# Patient Record
Sex: MALE | Race: BLACK OR AFRICAN AMERICAN | NOT HISPANIC OR LATINO | Employment: FULL TIME | URBAN - METROPOLITAN AREA
[De-identification: names, ages, dates, MRNs, and addresses within clinical notes are randomized per-mention and may not be internally consistent; named-entity substitution may affect disease eponyms.]

---

## 2017-05-23 ENCOUNTER — HOSPITAL ENCOUNTER (EMERGENCY)
Facility: HOSPITAL | Age: 37
Discharge: HOME | End: 2017-05-23
Attending: EMERGENCY MEDICINE | Admitting: EMERGENCY MEDICINE

## 2017-05-23 VITALS
TEMPERATURE: 97.9 F | DIASTOLIC BLOOD PRESSURE: 91 MMHG | RESPIRATION RATE: 18 BRPM | HEART RATE: 53 BPM | WEIGHT: 180 LBS | OXYGEN SATURATION: 98 % | SYSTOLIC BLOOD PRESSURE: 131 MMHG

## 2017-05-23 DIAGNOSIS — R31.0 GROSS HEMATURIA: Primary | ICD-10-CM

## 2017-05-23 LAB
ANION GAP BLD CALC-SCNC: 16 MMOL/L (ref 4–13)
BACTERIA UR QL AUTO: ABNORMAL /HPF
BILIRUB UR QL STRIP: NEGATIVE
BUN BLD-MCNC: 17 MG/DL (ref 5–25)
CA-I BLD-SCNC: 1.15 MMOL/L (ref 1.12–1.32)
CHLORIDE BLD-SCNC: 103 MMOL/L (ref 100–108)
CLARITY UR: ABNORMAL
CLARITY, POC: NORMAL
COLOR UR: ABNORMAL
COLOR, POC: NORMAL
CREAT BLD-MCNC: 1.2 MG/DL (ref 0.6–1.3)
GFR SERPL CREATININE-BSD FRML MDRD: >60 ML/MIN/1.73SQ M
GLUCOSE SERPL-MCNC: 113 MG/DL (ref 65–140)
GLUCOSE UR STRIP-MCNC: NEGATIVE MG/DL
HCT VFR BLD CALC: 42 % (ref 36.5–49.3)
HGB BLDA-MCNC: 14.3 G/DL (ref 12–17)
HGB UR QL STRIP.AUTO: ABNORMAL
HYALINE CASTS #/AREA URNS LPF: ABNORMAL /LPF
KETONES UR STRIP-MCNC: NEGATIVE MG/DL
LEUKOCYTE ESTERASE UR QL STRIP: ABNORMAL
NITRITE UR QL STRIP: NEGATIVE
NON-SQ EPI CELLS URNS QL MICRO: ABNORMAL /HPF
PCO2 BLD: 28 MMOL/L (ref 21–32)
PH UR STRIP.AUTO: 6.5 [PH] (ref 4.5–8)
POTASSIUM BLD-SCNC: 3.9 MMOL/L (ref 3.5–5.3)
PROT UR STRIP-MCNC: ABNORMAL MG/DL
RBC #/AREA URNS AUTO: ABNORMAL /HPF
SODIUM BLD-SCNC: 142 MMOL/L (ref 136–145)
SP GR UR STRIP.AUTO: 1.02 (ref 1–1.03)
SPECIMEN SOURCE: ABNORMAL
UROBILINOGEN UR QL STRIP.AUTO: 1 E.U./DL
WBC #/AREA URNS AUTO: ABNORMAL /HPF

## 2017-05-23 PROCEDURE — 85014 HEMATOCRIT: CPT

## 2017-05-23 PROCEDURE — 87491 CHLMYD TRACH DNA AMP PROBE: CPT | Performed by: EMERGENCY MEDICINE

## 2017-05-23 PROCEDURE — 87591 N.GONORRHOEAE DNA AMP PROB: CPT | Performed by: EMERGENCY MEDICINE

## 2017-05-23 PROCEDURE — 99283 EMERGENCY DEPT VISIT LOW MDM: CPT

## 2017-05-23 PROCEDURE — 80047 BASIC METABLC PNL IONIZED CA: CPT

## 2017-05-23 PROCEDURE — 81002 URINALYSIS NONAUTO W/O SCOPE: CPT | Performed by: EMERGENCY MEDICINE

## 2017-05-23 PROCEDURE — 81001 URINALYSIS AUTO W/SCOPE: CPT

## 2017-05-23 PROCEDURE — 51798 US URINE CAPACITY MEASURE: CPT

## 2017-05-23 RX ORDER — CEPHALEXIN 500 MG/1
500 CAPSULE ORAL 2 TIMES DAILY
Qty: 10 CAPSULE | Refills: 0 | Status: SHIPPED | OUTPATIENT
Start: 2017-05-23 | End: 2017-05-28

## 2017-05-24 LAB
CHLAMYDIA DNA CVX QL NAA+PROBE: NORMAL
N GONORRHOEA DNA GENITAL QL NAA+PROBE: NORMAL

## 2021-01-07 ENCOUNTER — HOSPITAL ENCOUNTER (EMERGENCY)
Facility: HOSPITAL | Age: 41
Discharge: HOME | End: 2021-01-07
Attending: EMERGENCY MEDICINE | Admitting: EMERGENCY MEDICINE
Payer: OTHER MISCELLANEOUS

## 2021-01-07 VITALS — TEMPERATURE: 98.1 F | RESPIRATION RATE: 18 BRPM | HEART RATE: 68 BPM | OXYGEN SATURATION: 99 %

## 2021-01-07 DIAGNOSIS — H10.32 ACUTE CONJUNCTIVITIS OF LEFT EYE, UNSPECIFIED ACUTE CONJUNCTIVITIS TYPE: ICD-10-CM

## 2021-01-07 DIAGNOSIS — T26.02XA BURN OF LEFT EYELID, INITIAL ENCOUNTER: ICD-10-CM

## 2021-01-07 DIAGNOSIS — H53.9 VISUAL DISTURBANCE: Primary | ICD-10-CM

## 2021-01-07 PROCEDURE — 99282 EMERGENCY DEPT VISIT SF MDM: CPT

## 2021-01-07 PROCEDURE — 99284 EMERGENCY DEPT VISIT MOD MDM: CPT | Performed by: PHYSICIAN ASSISTANT

## 2021-01-07 RX ORDER — TETRACAINE HYDROCHLORIDE 5 MG/ML
1 SOLUTION OPHTHALMIC ONCE
Status: DISCONTINUED | OUTPATIENT
Start: 2021-01-07 | End: 2021-01-07 | Stop reason: HOSPADM

## 2021-01-07 RX ORDER — ERYTHROMYCIN 5 MG/G
OINTMENT OPHTHALMIC
Qty: 1 G | Refills: 0 | Status: SHIPPED | OUTPATIENT
Start: 2021-01-07

## 2021-01-07 RX ORDER — CIPROFLOXACIN HYDROCHLORIDE 3.5 MG/ML
1 SOLUTION/ DROPS TOPICAL 3 TIMES DAILY
Qty: 5 ML | Refills: 0 | Status: SHIPPED | OUTPATIENT
Start: 2021-01-07

## 2021-01-07 NOTE — Clinical Note
Enedelia Parker was seen and treated in our emergency department on 1/7/2021  Diagnosis:     Sera López  may return to work on return date  He may return on this date: 01/09/2021         If you have any questions or concerns, please don't hesitate to call        Indu De La Cruz MD    ______________________________           _______________          _______________  Hospital Representative                              Date                                Time

## 2021-01-09 NOTE — ED PROVIDER NOTES
History  Chief Complaint   Patient presents with    Eye Problem     pt reports he got a cleaning chemical in his L eye at work  reports blurred vision  eye redness and lid swelling noted      41 y o  male presents for evaluation of left eye pain, swelling and wound on left eyelid  Notes while at work and he was cleaning hot griddle last night, he was splashed with hot griddle , while cleaning flat top  States he did flush eye copiously , then used eye was solution provided by his place of employment  States he did have tiny burn to upper eyelid, eyelid is swollen and tender, vision is slightly blurry  Denies AH, N/V/D, SOB, CP          None       Past Medical History:   Diagnosis Date    Diabetes mellitus ([facility] [address] )        No past surgical history on file  No family history on file  I have reviewed and agree with the history as documented  E-Cigarette/Vaping     E-Cigarette/Vaping Substances     Social History     Tobacco Use    Smoking status: Current Every Day Smoker   Substance Use Topics    Alcohol use: Yes     Comment: occasoinly    Drug use: No       Review of Systems   Eyes: Positive for pain and visual disturbance  All other systems reviewed and are negative  Physical Exam  Physical Exam  Vitals signs and nursing note reviewed  Constitutional:       Appearance: Normal appearance  He is normal weight  HENT:      Head: Normocephalic and atraumatic  Right Ear: External ear normal       Left Ear: External ear normal       Mouth/Throat:      Mouth: Mucous membranes are moist       Pharynx: Oropharynx is clear  Eyes:      General: Vision grossly intact  Extraocular Movements:      Left eye: Normal extraocular motion and no nystagmus  Conjunctiva/sclera:      Left eye: Left conjunctiva is injected  Pupils: Pupils are equal, round, and reactive to light  Left eye: No corneal abrasion or fluorescein uptake        Slit lamp exam:     Left eye: No corneal flare, corneal ulcer, foreign body or photophobia  Comments: No fluroscein uptake noted, tace lid edema, small wound note on upper eyelid  PH 7 0-7 5     Neck:      Musculoskeletal: Normal range of motion  Cardiovascular:      Rate and Rhythm: Normal rate and regular rhythm  Pulses: Normal pulses  Heart sounds: Normal heart sounds  Pulmonary:      Effort: Pulmonary effort is normal       Breath sounds: Normal breath sounds  Abdominal:      General: Abdomen is flat  Musculoskeletal: Normal range of motion  Skin:     General: Skin is warm  Capillary Refill: Capillary refill takes less than 2 seconds  Neurological:      General: No focal deficit present  Mental Status: He is alert and oriented to person, place, and time  Mental status is at baseline  Psychiatric:         Mood and Affect: Mood normal          Thought Content: Thought content normal          Judgment: Judgment normal          Vital Signs  ED Triage Vitals   Temperature Pulse Respirations BP SpO2   01/07/21 1431 01/07/21 1336 01/07/21 1336 -- 01/07/21 1336   98 1 °F (36 7 °C) 68 18  99 %      Temp src Heart Rate Source Patient Position - Orthostatic VS BP Location FiO2 (%)   -- 01/07/21 1336 -- -- --    Monitor         Pain Score       --                  Vitals:    01/07/21 1336   Pulse: 68         Visual Acuity      ED Medications  Medications - No data to display    Diagnostic Studies  Results Reviewed     None                 No orders to display              Procedures  Procedures         ED Course                                           MDM  Number of Diagnoses or Management Options  Acute conjunctivitis of left eye, unspecified acute conjunctivitis type:   Burn of left eyelid, initial encounter:   Visual disturbance:   Diagnosis management comments: spokw with optho on call  Recommended erythromycin ointment, q HS Ciloxan TID and follow up outpt    Strict return precautions discussed, follow up discussed, all questions answered  Pt verbalized understanding will DC home      Disposition  Final diagnoses:   Visual disturbance   Burn of left eyelid, initial encounter   Acute conjunctivitis of left eye, unspecified acute conjunctivitis type     Time reflects when diagnosis was documented in both MDM as applicable and the Disposition within this note     Time User Action Codes Description Comment    1/7/2021  2:38 PM Zeb Pinch Add [H53 9] Visual disturbance     1/7/2021  2:39 PM Linda Granda  02XA] Burn of left eyelid, initial encounter     1/7/2021  2:40 PM Zeb Pinch Add [H10 32] Acute conjunctivitis of left eye, unspecified acute conjunctivitis type       ED Disposition     ED Disposition Condition Date/Time Comment    Discharge Stable Thu Jan 7, 2021  2:40 PM Hugo Walker discharge to home/self care  Follow-up Information     Follow up With Specialties Details Why [facility]   [address] [place]  [address] [place] [address]  [phone]    Jerone Crimes    [phone]            Discharge Medication List as of 1/7/2021  2:40 PM      START taking these medications    Details   ciprofloxacin (CILOXAN) 0 3 % ophthalmic solution Administer 1 drop into the left eye 3 (three) times a day, Starting Thu 1/7/2021, Print      erythromycin (ILOTYCIN) ophthalmic ointment Place a 1/2 inch ribbon of ointment into the lower eyelid  , Print           No discharge procedures on file      PDMP Review     None          ED Provider  Electronically Signed by           Celestina Mcfarlane PA-C  01/09/21 2970

## 2021-09-08 ENCOUNTER — HOSPITAL ENCOUNTER (EMERGENCY)
Facility: HOSPITAL | Age: 41
Discharge: HOME | End: 2021-09-08
Attending: EMERGENCY MEDICINE

## 2021-09-08 VITALS
SYSTOLIC BLOOD PRESSURE: 157 MMHG | HEIGHT: 68 IN | WEIGHT: 170 LBS | DIASTOLIC BLOOD PRESSURE: 109 MMHG | OXYGEN SATURATION: 99 % | TEMPERATURE: 98.7 F | BODY MASS INDEX: 25.76 KG/M2 | RESPIRATION RATE: 16 BRPM | HEART RATE: 70 BPM

## 2021-09-08 DIAGNOSIS — R68.83 CHILLS: Primary | ICD-10-CM

## 2021-09-08 DIAGNOSIS — R03.0 ELEVATED BLOOD PRESSURE READING: ICD-10-CM

## 2021-09-08 DIAGNOSIS — R53.83 FATIGUE: ICD-10-CM

## 2021-09-08 DIAGNOSIS — R09.81 NASAL CONGESTION: ICD-10-CM

## 2021-09-08 DIAGNOSIS — Z20.822 ENCOUNTER FOR LABORATORY TESTING FOR COVID-19 VIRUS: ICD-10-CM

## 2021-09-08 LAB — SARS-COV-2 RNA RESP QL NAA+PROBE: POSITIVE

## 2021-09-08 PROCEDURE — U0003 INFECTIOUS AGENT DETECTION BY NUCLEIC ACID (DNA OR RNA); SEVERE ACUTE RESPIRATORY SYNDROME CORONAVIRUS 2 (SARS-COV-2) (CORONAVIRUS DISEASE [COVID-19]), AMPLIFIED PROBE TECHNIQUE, MAKING USE OF HIGH THROUGHPUT TECHNOLOGIES AS DESCRIBED BY CMS-2020-01-R: HCPCS | Performed by: EMERGENCY MEDICINE

## 2021-09-08 PROCEDURE — 99283 EMERGENCY DEPT VISIT LOW MDM: CPT

## 2021-09-08 PROCEDURE — 99284 EMERGENCY DEPT VISIT MOD MDM: CPT | Performed by: EMERGENCY MEDICINE

## 2021-09-08 PROCEDURE — U0005 INFEC AGEN DETEC AMPLI PROBE: HCPCS | Performed by: EMERGENCY MEDICINE

## 2021-09-08 RX ORDER — OXYMETAZOLINE HYDROCHLORIDE 0.05 G/100ML
2 SPRAY NASAL 2 TIMES DAILY
Qty: 1.2 ML | Refills: 0 | Status: SHIPPED | OUTPATIENT
Start: 2021-09-08 | End: 2021-09-13

## 2021-09-08 RX ORDER — IBUPROFEN 600 MG/1
600 TABLET ORAL EVERY 6 HOURS PRN
Qty: 30 TABLET | Refills: 0 | Status: SHIPPED | OUTPATIENT
Start: 2021-09-08

## 2021-09-08 RX ORDER — SENNOSIDES 8.6 MG
650 CAPSULE ORAL EVERY 8 HOURS PRN
Qty: 30 TABLET | Refills: 0 | Status: SHIPPED | OUTPATIENT
Start: 2021-09-08

## 2021-09-08 NOTE — Clinical Note
Walter Herrera was seen and treated in our emergency department on 9/8/2021  Diagnosis:     Jennifer Laughlin    He may return on this date: If you have any questions or concerns, please don't hesitate to call        Konrad Coles MD    ______________________________           _______________          _______________  Hospital Representative                              Date                                Time

## 2021-09-08 NOTE — Clinical Note
Luis Enrique Ravindra was seen and treated in our emergency department on 9/8/2021  Diagnosis:     Tyler Palmer    He may return on this date: If you have any questions or concerns, please don't hesitate to call        Sandra Armenta MD    ______________________________           _______________          _______________  Hospital Representative                              Date                                Time

## 2021-09-08 NOTE — ED PROVIDER NOTES
History  Chief Complaint   Patient presents with   Marianne Clementine     states he was recently in [employer], started with chills  c/o increasing fatigue  34 yo M with h/o type 2 DM, smoking, presenting for 1 week of chills, fatigue, and generalized malaise  Started initially on 9/1/21 while patient was in [facility] on vacation  No confirmed COVID-19 exposures  Patient is not vaccinated for COVID-19  Has also noted some frontal head pressure intermittently  No neck pain  Has not had any GI symptoms  No abdominal pain  Has not any respiratory symptoms, no chest pain, shortness of breath, coughing  No other complaints today  History provided by:  Patient   used: No        Prior to Admission Medications   Prescriptions Last Dose Informant Patient Reported? Taking?   ciprofloxacin (CILOXAN) 0 3 % ophthalmic solution   No No   Sig: Administer 1 drop into the left eye 3 (three) times a day   erythromycin (ILOTYCIN) ophthalmic ointment   No No   Sig: Place a 1/2 inch ribbon of ointment into the lower eyelid  Facility-Administered Medications: None       Past Medical History:   Diagnosis Date    Diabetes mellitus ([facility] [address] )        No past surgical history on file  No family history on file  I have reviewed and agree with the history as documented  E-Cigarette/Vaping     E-Cigarette/Vaping Substances     Social History     Tobacco Use    Smoking status: Current Every Day Smoker   Substance Use Topics    Alcohol use: Yes     Comment: occasoinly    Drug use: No        Review of Systems   Constitutional: Positive for chills and fatigue  Negative for fever  HENT: Negative for congestion, rhinorrhea and sore throat  Eyes: Negative for pain and visual disturbance  Respiratory: Negative for cough, shortness of breath and wheezing  Cardiovascular: Negative for chest pain and palpitations  Gastrointestinal: Negative for abdominal pain, diarrhea, nausea and vomiting  Genitourinary: Negative for dysuria and hematuria  Musculoskeletal: Negative for arthralgias and back pain  Skin: Negative for color change and rash  Neurological: Negative for syncope, weakness, light-headedness and headaches  Psychiatric/Behavioral: Negative for confusion  The patient is not nervous/anxious  All other systems reviewed and are negative  Physical Exam  ED Triage Vitals [09/08/21 1341]   Temperature Pulse Respirations Blood Pressure SpO2   98 7 °F (37 1 °C) 70 16 (!) 157/109 99 %      Temp Source Heart Rate Source Patient Position - Orthostatic VS BP Location FiO2 (%)   Tympanic Monitor Sitting Right arm --      Pain Score       No Pain             Orthostatic Vital Signs  Vitals:    09/08/21 1341   BP: (!) 157/109   Pulse: 70   Patient Position - Orthostatic VS: Sitting       Physical Exam  Vitals and nursing note reviewed  Constitutional:       General: He is not in acute distress  Appearance: Normal appearance  He is well-developed  He is not ill-appearing or diaphoretic  HENT:      Head: Normocephalic and atraumatic  Right Ear: External ear normal       Left Ear: External ear normal       Nose: Nose normal       Mouth/Throat:      Mouth: Mucous membranes are moist       Pharynx: Oropharynx is clear  Eyes:      Conjunctiva/sclera: Conjunctivae normal       Pupils: Pupils are equal, round, and reactive to light  Cardiovascular:      Rate and Rhythm: Normal rate and regular rhythm  Heart sounds: No murmur heard  Pulmonary:      Effort: Pulmonary effort is normal  No respiratory distress  Breath sounds: Normal breath sounds  Abdominal:      General: Abdomen is flat  There is no distension  Palpations: Abdomen is soft  Tenderness: There is no abdominal tenderness  Musculoskeletal:         General: Normal range of motion  Cervical back: Normal range of motion and neck supple  Right lower leg: No edema        Left lower leg: No edema    Skin:     General: Skin is warm and dry  Neurological:      General: No focal deficit present  Mental Status: He is alert  Psychiatric:         Mood and Affect: Mood normal          ED Medications  Medications - No data to display    Diagnostic Studies  Results Reviewed     Procedure Component Value Units Date/Time    [id number]  (Abnormal) Collected: 09/08/21 1512    Lab Status: Final result Specimen: Nares from Nose Updated: 09/08/21 1703     SARS-CoV-2 Positive    Narrative:                        No orders to display         Procedures  Procedures      ED Course                                       MDM  Number of Diagnoses or Management Options  Chills  Elevated blood pressure reading  Encounter for laboratory testing for COVID-19 virus  Fatigue  Nasal congestion  Diagnosis management comments: 40-year-old male presenting for evaluation of 40-year-old male presenting for evaluation of chills, fatigue, recent travel to [facility]  Concerning for COVID-19 infection  Patient also has nasal congestion, will treat with Afrin, acetaminophen, ibuprofen  Follow up with primary care physician  Discussed return precautions with patient as well as quarantine duration if patient is found to be positive        Disposition  Final diagnoses:   Chills   Fatigue   Elevated blood pressure reading   Encounter for laboratory testing for COVID-19 virus   Nasal congestion     Time reflects when diagnosis was documented in both MDM as applicable and the Disposition within this note     Time User Action Codes Description Comment    9/8/2021  3:18 PM Mary, Olmito Bent Add [R68 83] Chills     9/8/2021  3:18 PM Mary, Olmito Bent Add [R53 83] Fatigue     9/8/2021  3:18 PM Mary, Lavelle Bent Add [R03 0] Elevated blood pressure reading     9/8/2021  3:18 PM Mary, Olmito Bent Add [Z20 822] Encounter for laboratory testing for COVID-19 virus     9/8/2021  3:26 PM Mary, Olmito Bent Add [R09 81] Nasal congestion       ED Disposition     ED Disposition Condition Date/Time Comment    Discharge Good Wed Sep 8, 2021  3:18 PM Lorraine Wilson discharge to home/self care  Follow-up Information     Follow up With Specialties Details Why Contact Info Additional [address] Emergency Department Emergency Medicine Go to  As needed [address] [facility]  [address] Emergency Department, [address], RENAN Perez [facility], [address]  Internal Medicine Schedule an appointment as soon as possible for a visit in 1 week For reevaluation as we discussed  [address] [facility] [address]  [facility] [address], [address], [address], [place], [facility], [address] [phone]          Discharge Medication List as of 9/8/2021  3:27 PM      START taking these medications    Details   acetaminophen (TYLENOL) 650 mg CR tablet Take 1 tablet (650 mg total) by mouth every 8 (eight) hours as needed for mild pain, Starting Wed 9/8/2021, Print      ibuprofen (MOTRIN) 600 mg tablet Take 1 tablet (600 mg total) by mouth every 6 (six) hours as needed for mild pain or moderate pain, Starting Wed 9/8/2021, Print      oxymetazoline (AFRIN) 0 05 % nasal spray 2 sprays by Each Nare route 2 (two) times a day for 3 days, Starting Wed 9/8/2021, Until Sat 9/11/2021, Print         CONTINUE these medications which have NOT CHANGED    Details   ciprofloxacin (CILOXAN) 0 3 % ophthalmic solution Administer 1 drop into the left eye 3 (three) times a day, Starting Thu 1/7/2021, Print      erythromycin (ILOTYCIN) ophthalmic ointment Place a 1/2 inch ribbon of ointment into the lower eyelid  , Print           No discharge procedures on file      PDMP Review     None           ED Provider  Attending physically available and evaluated Hal Lobo Huong Alvarez I managed the patient along with the ED Attending      Electronically Signed by         Lilli Nichols MD  09/09/21 6212

## 2021-09-08 NOTE — Clinical Note
Ariana Hayden was seen and treated in our emergency department on 9/8/2021  Diagnosis:     Janessa Para    He may return on this date: If you have any questions or concerns, please don't hesitate to call        Adelso Cardoso MD    ______________________________           _______________          _______________  Hospital Representative                              Date                                Time

## 2021-09-08 NOTE — ED ATTENDING ATTESTATION
Final Diagnosis:  1  Chills    2  Fatigue    3  Elevated blood pressure reading    4  Encounter for laboratory testing for COVID-19 virus    5  Nasal congestion           I, Skinny Morales MD, saw and evaluated the patient  All available labs and X-rays were ordered by me or the resident and have been reviewed by myself  I discussed the patient with the resident / non-physician and agree with the resident's / non-physician practitioner's findings and plan as documented in the resident's / non-physician practicitioner's note, except where noted  At this point, I agree with the current assessment done in the ED  I was present during key portions of all procedures performed unless otherwise stated  Chief Complaint   Patient presents with    Chills     states he was recently in [facility], started with chills  c/o increasing fatigue  This is a 35 y o  male presenting for evaluation of chills, fatigue x3 days  Was in [facility] 1 week ago  No f/ch   +myalgias  No SOB  Quit smoking 5 days ago  No CP  PMH:   has a past medical history of Diabetes mellitus ([facility] [address] )  PSH:   has no past surgical history on file  Social:  Social History     Substance and Sexual Activity   Alcohol Use Yes    Comment: occasoinly     Social History     Tobacco Use   Smoking Status Current Every Day Smoker     Social History     Substance and Sexual Activity   Drug Use No     PE:  Vitals:    09/08/21 1341   BP: (!) 157/109   BP Location: Right arm   Pulse: 70   Resp: 16   Temp: 98 7 °F (37 1 °C)   TempSrc: Tympanic   SpO2: 99%   Weight: 77 1 kg (170 lb)   Height: 5' 8" (1 727 m)   General: VSS, NAD, awake, alert  Well-nourished, well-developed  Appears stated age  Head: Normocephalic, atraumatic, nontender  Eyes: PERRL, EOM-I  No diplopia  No hyphema  No subconjunctival hemorrhages  Symmetrical lids  ENTAtraumatic external nose and ears  MMM  No stridor  Normal phonation  No drooling     Base of mouth is soft  No mastoid tenderness  Neck: Symmetric, trachea midline  No JVD  CV: Peripheral pulses +2 throughout  No chest wall tenderness  Lungs:   Unlabored   No retractions  No crepitus  No tachypnea  No paradoxical motion  Abd: +BS, soft, NT/ND    MSK:   FROM   No lower extremity edema  Back:   No CVAT  Skin: Dry, intact  Neuro: AAOx3, GCS 15, CN II-XII grossly intact  Motor grossly intact  Psychiatric/Behavioral: Appropriate mood and affect   Exam: deferred  A:  - COVID test  P:  - viral syndrome  - reassuring exam  - DC    - 13 point ROS was performed and all are normal unless stated in the history above  - Nursing note reviewed  Vitals reviewed  - Orders placed by myself and/or advanced practitioner / resident     - Previous chart was reviewed  - No language barrier    - History obtained from patient  - There are no limitations to the history obtained  - Critical care time: Not applicable for this patient  Code Status: No Order  Advance Directive and Living Will:      Power of :    POLST:      Medications - No data to display  No orders to display     Orders Placed This Encounter   Procedures    Novel Coronavirus (Covid-19),PCR [facility]     Labs Reviewed - No data to display  Time reflects when diagnosis was documented in both MDM as applicable and the Disposition within this note       Time User Action Codes Description Comment    9/8/2021  3:18 PM Neetu Hernandez Add [R68 83] Chills     9/8/2021  3:18 PM Tonya Hernandez Add [R53 83] Fatigue     9/8/2021  3:18 PM Neetu Hernandez Add [R03 0] Elevated blood pressure reading     9/8/2021  3:18 PM Neetu Hernandez Add [Z20 822] Encounter for laboratory testing for COVID-19 virus     9/8/2021  3:26 PM Neetu Hernandez Add [R09 81] Nasal congestion           ED Disposition       ED Disposition Condition Date/Time Comment    Discharge Good Wed Sep 8, 2021  3:18 PM Loco Mix discharge to home/self care                  Follow-up Information       Follow up With Specialties Details Why Contact Info Additional [address] Emergency Department Emergency Medicine Go to  As needed [address]  [address] [facility] [address] Emergency Department, [address], [place], [address] [facility], [address] Internal Medicine Schedule an appointment as soon as possible for a visit in 1 week For reevaluation as we discussed  [address] [facility] [address]  [facility] [address], [address], [address], [place], [address] [facility], [address] [phone]          Discharge Medication List as of 9/8/2021  3:27 PM        START taking these medications    Details   acetaminophen (TYLENOL) 650 mg CR tablet Take 1 tablet (650 mg total) by mouth every 8 (eight) hours as needed for mild pain, Starting Wed 9/8/2021, Print      ibuprofen (MOTRIN) 600 mg tablet Take 1 tablet (600 mg total) by mouth every 6 (six) hours as needed for mild pain or moderate pain, Starting Wed 9/8/2021, Print      oxymetazoline (AFRIN) 0 05 % nasal spray 2 sprays by Each Nare route 2 (two) times a day for 3 days, Starting Wed 9/8/2021, Until Sat 9/11/2021, Print           CONTINUE these medications which have NOT CHANGED    Details   ciprofloxacin (CILOXAN) 0 3 % ophthalmic solution Administer 1 drop into the left eye 3 (three) times a day, Starting Thu 1/7/2021, Print      erythromycin (ILOTYCIN) ophthalmic ointment Place a 1/2 inch ribbon of ointment into the lower eyelid  , Print           No discharge procedures on file  Prior to Admission Medications   Prescriptions Last Dose Informant Patient Reported?  Taking?   ciprofloxacin (CILOXAN) 0 3 % ophthalmic solution   No No   Sig: Administer 1 drop into the left eye 3 (three) times a day   erythromycin (ILOTYCIN) ophthalmic ointment   No No   Sig: Place a 1/2 inch ribbon of ointment into the lower eyelid  Facility-Administered Medications: None       Portions of the record may have been created with voice recognition software  Occasional wrong word or "sound a like" substitutions may have occurred due to the inherent limitations of voice recognition software  Read the chart carefully and recognize, using context, where substitutions have occurred      Electronically signed by:  Mark Mackey

## 2021-09-13 ENCOUNTER — TELEMEDICINE (OUTPATIENT)
Dept: INTERNAL MEDICINE CLINIC | Facility: CLINIC | Age: 41
End: 2021-09-13

## 2021-09-13 DIAGNOSIS — U07.1 COVID-19: Primary | ICD-10-CM

## 2021-09-13 PROCEDURE — 99213 OFFICE O/P EST LOW 20 MIN: CPT | Performed by: INTERNAL MEDICINE

## 2021-09-13 NOTE — ASSESSMENT & PLAN NOTE
· Patient developed symptoms around 09/01/21 including chills, dyspnea on exertion and nasal congestion after [facility] [place] vacation  · Patient presented to [facility] on 09/08- COVID PCR positive  Patient was discharged with Afrin, Ibuprofen, and Tylenol    · Symptoms currently improving  · Repeat COVID test in 5 days- can return to work if negative  · Discussed quarantine precautions

## 2021-09-13 NOTE — PROGRESS NOTES
[facility] Visit Note  Omar Arango 35 y o  male   MRN: [id number]    Assessment and Plan    1  COVID-19  Assessment & Plan:  · Patient developed symptoms around 09/01/21 including chills, dyspnea on exertion and nasal congestion after [facility] vacation  · Patient presented to [facility] ED on 09/08- COVID PCR positive  Patient was discharged with Afrin, Ibuprofen, and Tylenol  · Symptoms currently improving  · Repeat COVID test in 5 days- can return to work if negative  · Discussed quarantine precautions          Follow up in our clinic in 3 month(s) to establish care  Subjective   CHIEF COMPLAINT:   Chief Complaint   Patient presents with   [id number] [phone] [id number]     Per patient he still have loss of smell  Per patient his energy is getting better    Virtual Regular Visit      HISTORY OF PRESENT ILLNESS:  Omar Arango is a 35 y o  male presenting through telemedicine visit today for COVID follow-up  Patient developed symptoms around 09/01/21 including chills, dyspnea on exertion and nasal congestion  Patient presented to [facility] ED on 09/08- COVID PCR positive  Patient was discharged with Afrin, Ibuprofen, and Tylenol  Patient took Ibuprofen 600 mg twice daily for a couple days, and has used the Afrin as needed for nasal congestion  Patient has been checking temperatures, has remained afebrile  Patient currently feels much better  Current symptoms include loss of smell and some mild dyspnea on exertion that has greatly improved  Denies SOB, chest pain, headache, cough, swelling, or other complaints at this time  Patient reports that he is strongly considering getting the COVID vaccine  Patient would like to establish care with us here at [facility] [address] in the future  Patient states that he recently quit smoking cigarettes about 3-4 days prior to COVID positive- was smoking 1/4 pack per day since 10 yrs old   Patient is very motivated to continue to improve his health for his children and grandchild  Patient lives in [place] [facility] [place] [facility] [place] and works at [employer]- he will get a COVID test in 4-5 days- if negative can return to work  Review of Systems   Constitutional: Negative for chills, fatigue, fever and unexpected weight change  HENT: Negative for congestion, rhinorrhea, sinus pain and sore throat  Loss of smell   Eyes: Negative for visual disturbance  Respiratory: Positive for shortness of breath (mild, on exertion)  Negative for cough  Cardiovascular: Negative for chest pain, palpitations and leg swelling  Gastrointestinal: Negative for abdominal pain, blood in stool, constipation, diarrhea, nausea and vomiting  Endocrine: Negative for cold intolerance, heat intolerance, polydipsia and polyuria  Genitourinary: Negative for difficulty urinating, dysuria, frequency, hematuria and urgency  Musculoskeletal: Negative for arthralgias, back pain and myalgias  Skin: Negative for rash and wound  Neurological: Negative for dizziness, syncope, weakness, light-headedness and headaches  Psychiatric/Behavioral: Negative for behavioral problems, confusion and sleep disturbance  Objective     Vitals:     Physical Exam:  Physical Exam:     General: Awake, alert, and conversant  No acute distress  Eyes: Normal conjunctiva, anicteric  Round symmetric pupils  ENT: Hearing grossly intact  No nasal discharge  Neck: Neck is supple  No masses or thyromegaly visible  Respiratory: Respirations are non-labored  No audbile wheezing  Cardiovascular: No lower extremity edema  Skin: No rashes or ulcers  Neuro: A&O x 3  Psych: Cooperative  Appropriate mood and affect  Normal judgement           History     Current Outpatient Medications:     ibuprofen (MOTRIN) 600 mg tablet, Take 1 tablet (600 mg total) by mouth every 6 (six) hours as needed for mild pain or moderate pain, Disp: 30 tablet, Rfl: 0    oxymetazoline (AFRIN) 0 05 % nasal spray, 2 sprays by Each Nare route 2 (two) times a day for 3 days, Disp: 1 2 mL, Rfl: 0    acetaminophen (TYLENOL) 650 mg CR tablet, Take 1 tablet (650 mg total) by mouth every 8 (eight) hours as needed for mild pain (Patient not taking: Reported on 9/13/2021), Disp: 30 tablet, Rfl: 0    ciprofloxacin (CILOXAN) 0 3 % ophthalmic solution, Administer 1 drop into the left eye 3 (three) times a day (Patient not taking: Reported on 9/13/2021), Disp: 5 mL, Rfl: 0    erythromycin (ILOTYCIN) ophthalmic ointment, Place a 1/2 inch ribbon of ointment into the lower eyelid  (Patient not taking: Reported on 9/13/2021), Disp: 1 g, Rfl: 0  Allergies   Allergen Reactions    Aspirin      "It makes things worse"      Past Medical History:   Diagnosis Date    Diabetes mellitus ([facility] [address] )      No past surgical history on file  Social History     Socioeconomic History    Marital status: Single     Spouse name: Not on file    Number of children: 3    Years of education: Not on file    Highest education level: Not on file   Occupational History    Not on file   Tobacco Use    Smoking status: Former Smoker    Smokeless tobacco: Never Used    Tobacco comment: Per patient last time he smoke was two week ago   Vaping Use    Vaping Use: Never used   Substance and Sexual Activity    Alcohol use: Yes     Comment: occasoinly    Drug use: Yes     Types: Marijuana     Comment: Occasional    Sexual activity: Yes     Partners: Female   Other Topics Concern    Not on file   Social History Narrative    Not on file     Social Determinants of Health     Financial Resource Strain:     Difficulty of Paying Living Expenses:    Food Insecurity:     Worried About Running Out of Food in the Last Year:     Ran Out of Food in the Last Year:    Transportation Needs:     Lack of Transportation (Medical):      Lack of Transportation (Non-Medical):    Physical Activity:     Days of Exercise per Week:     Minutes of Exercise per Session:    Stress:     Feeling of Stress :    Social Connections:     Frequency of Communication with Friends and Family:     Frequency of Social Gatherings with Friends and Family:     Attends Yarsanism Services:     Active Member of Clubs or Organizations:     Attends Club or Organization Meetings:     Marital Status:    Intimate Partner Violence:     Fear of Current or Ex-Partner:     Emotionally Abused:     Physically Abused:     Sexually Abused:      Family History   Problem Relation Age of Onset    Hypertension Mother     Heart disease Father     No Known Problems Sister     No Known Problems Daughter     No Known Problems Sister     No Known Problems Sister     No Known Problems Sister     No Known Problems Son     No Known Problems Son          DO Eloise Valdovinos [address] Internal Medicine PGY-1  [address] [facility]  [address], [address] [facility]    PLEASE NOTE:  This encounter was completed utilizing the M-Modal[employer] Direct Speech Voice Recognition Software  Grammatical errors, random word insertions, pronoun errors and incomplete sentences are occasional consequences of the system due to software limitations, ambient noise and hardware issues  These may be missed by proof reading prior to affixing electronic signature  Any questions or concerns about the content, text or information contained within the body of this dictation should be directly addressed to the physician for clarification  Please do not hesitate to call me directly if you have any any questions or concerns

## 2025-07-23 ENCOUNTER — HOSPITAL ENCOUNTER (EMERGENCY)
Facility: HOSPITAL | Age: 45
Discharge: HOME | End: 2025-07-23
Attending: EMERGENCY MEDICINE
Payer: COMMERCIAL

## 2025-07-23 VITALS
TEMPERATURE: 99.1 F | DIASTOLIC BLOOD PRESSURE: 80 MMHG | HEIGHT: 68 IN | BODY MASS INDEX: 28.04 KG/M2 | HEART RATE: 65 BPM | RESPIRATION RATE: 18 BRPM | WEIGHT: 185 LBS | OXYGEN SATURATION: 99 % | SYSTOLIC BLOOD PRESSURE: 129 MMHG

## 2025-07-23 DIAGNOSIS — F12.188 CANNABIS HYPEREMESIS SYNDROME CONCURRENT WITH AND DUE TO CANNABIS ABUSE (HCC): ICD-10-CM

## 2025-07-23 DIAGNOSIS — K92.0 HEMATEMESIS/VOMITING BLOOD: Primary | ICD-10-CM

## 2025-07-23 DIAGNOSIS — K29.70 GASTRITIS: ICD-10-CM

## 2025-07-23 LAB
ALBUMIN SERPL BCG-MCNC: 4 G/DL (ref 3.5–5)
ALP SERPL-CCNC: 62 U/L (ref 34–104)
ALT SERPL W P-5'-P-CCNC: 17 U/L (ref 7–52)
ANION GAP SERPL CALCULATED.3IONS-SCNC: 5 MMOL/L (ref 4–13)
AST SERPL W P-5'-P-CCNC: 14 U/L (ref 13–39)
ATRIAL RATE: 55 BPM
BASOPHILS # BLD MANUAL: 0 THOUSAND/UL (ref 0–0.1)
BASOPHILS NFR MAR MANUAL: 0 % (ref 0–1)
BILIRUB SERPL-MCNC: 0.71 MG/DL (ref 0.2–1)
BUN SERPL-MCNC: 10 MG/DL (ref 5–25)
CALCIUM SERPL-MCNC: 8.4 MG/DL (ref 8.4–10.2)
CHLORIDE SERPL-SCNC: 107 MMOL/L (ref 96–108)
CO2 SERPL-SCNC: 28 MMOL/L (ref 21–32)
CREAT SERPL-MCNC: 1.01 MG/DL (ref 0.6–1.3)
EOSINOPHIL # BLD MANUAL: 0.28 THOUSAND/UL (ref 0–0.4)
EOSINOPHIL NFR BLD MANUAL: 5 % (ref 0–6)
ERYTHROCYTE [DISTWIDTH] IN BLOOD BY AUTOMATED COUNT: 14.1 % (ref 11.6–15.1)
GFR SERPL CREATININE-BSD FRML MDRD: 88 ML/MIN/1.73SQ M
GLUCOSE SERPL-MCNC: 96 MG/DL (ref 65–140)
HCT VFR BLD AUTO: 39.7 % (ref 36.5–49.3)
HGB BLD-MCNC: 13.2 G/DL (ref 12–17)
LIPASE SERPL-CCNC: 38 U/L (ref 11–82)
LYMPHOCYTES # BLD AUTO: 1.58 THOUSAND/UL (ref 0.6–4.47)
LYMPHOCYTES # BLD AUTO: 24 % (ref 14–44)
MCH RBC QN AUTO: 27.8 PG (ref 26.8–34.3)
MCHC RBC AUTO-ENTMCNC: 33.2 G/DL (ref 31.4–37.4)
MCV RBC AUTO: 84 FL (ref 82–98)
MONOCYTES # BLD AUTO: 0.4 THOUSAND/UL (ref 0–1.22)
MONOCYTES NFR BLD: 7 % (ref 4–12)
NEUTROPHILS # BLD MANUAL: 3.4 THOUSAND/UL (ref 1.85–7.62)
NEUTS SEG NFR BLD AUTO: 60 % (ref 43–75)
P AXIS: 30 DEGREES
PLATELET # BLD AUTO: 174 THOUSANDS/UL (ref 149–390)
PLATELET BLD QL SMEAR: ADEQUATE
PMV BLD AUTO: 11.4 FL (ref 8.9–12.7)
POTASSIUM SERPL-SCNC: 3.8 MMOL/L (ref 3.5–5.3)
PR INTERVAL: 202 MS
PROT SERPL-MCNC: 6.2 G/DL (ref 6.4–8.4)
QRS AXIS: 49 DEGREES
QRSD INTERVAL: 86 MS
QT INTERVAL: 434 MS
QTC INTERVAL: 415 MS
RBC # BLD AUTO: 4.75 MILLION/UL (ref 3.88–5.62)
RBC MORPH BLD: NORMAL
SODIUM SERPL-SCNC: 140 MMOL/L (ref 135–147)
T WAVE AXIS: 32 DEGREES
VARIANT LYMPHS # BLD AUTO: 4 %
VENTRICULAR RATE: 55 BPM
WBC # BLD AUTO: 5.66 THOUSAND/UL (ref 4.31–10.16)

## 2025-07-23 PROCEDURE — 85007 BL SMEAR W/DIFF WBC COUNT: CPT

## 2025-07-23 PROCEDURE — 36415 COLL VENOUS BLD VENIPUNCTURE: CPT

## 2025-07-23 PROCEDURE — 93005 ELECTROCARDIOGRAM TRACING: CPT

## 2025-07-23 PROCEDURE — 80053 COMPREHEN METABOLIC PANEL: CPT

## 2025-07-23 PROCEDURE — 83690 ASSAY OF LIPASE: CPT

## 2025-07-23 PROCEDURE — 99285 EMERGENCY DEPT VISIT HI MDM: CPT | Performed by: EMERGENCY MEDICINE

## 2025-07-23 PROCEDURE — 85027 COMPLETE CBC AUTOMATED: CPT

## 2025-07-23 PROCEDURE — 93010 ELECTROCARDIOGRAM REPORT: CPT | Performed by: INTERNAL MEDICINE

## 2025-07-23 PROCEDURE — 99284 EMERGENCY DEPT VISIT MOD MDM: CPT

## 2025-07-23 NOTE — DISCHARGE INSTRUCTIONS
You were evaluated in the emergency department today for nausea and vomiting.    Please follow up with you PCP for reassessment of symptoms. Follow up with GI.    Please return to the ED if you experience intractable vomiting, ongoing blood in vomit, inability to eat or drink, fever/chills, blood in stool, inability to urinate for 12 hours or worsening of symptoms.

## 2025-07-23 NOTE — ED PROVIDER NOTES
Time reflects when diagnosis was documented in both MDM as applicable and the Disposition within this note       Time User Action Codes Description Comment    7/23/2025  1:19 PM Krys Zavaleta [K92.0] Hematemesis/vomiting blood     7/23/2025  1:20 PM Krys Zavaleta Add [K29.70] Gastritis     7/23/2025  1:20 PM Krys Zavaleta [F12.188] Cannabis hyperemesis syndrome concurrent with and due to cannabis abuse (HCC)           ED Disposition       ED Disposition   Discharge    Condition   Stable    Date/Time   Wed Jul 23, 2025  1:20 PM    Comment   Saroj Davis discharge to home/self care.                   Assessment & Plan       Medical Decision Making  Patient is a 45 y.o. male  who presents to the ED with vomiting blood.    Vital signs WNL. Exam as listed below.    History and physical exam are most consistent with hematemesis, gastritis, cannabis hyperemesis syndrome  Differential diagnosis I considered includes but is not limited to infectious etiology, obstruction, taco metz tear, cannabis hyperemesis syndrome    Plan   CBC to check for anemia  CMP to look for elevated BUN which could indicate active bleeding  Lipase  Bedside US to assess gallbladder pathology  EKG    Labs unremarkable.  No evidence of anemia.  No elevation of BUN.  Lipase within normal limits.  Bedside ultrasound performed by me reveals no acute gallbladder pathology.    View ED course below for further discussion on patient workup.       All labs reviewed and utilized in the medical decision making process  All radiology studies independently viewed by me and interpreted by the radiologist.  I reviewed all testing with the patient.     Upon re-evaluation patient is stable no nausea or vomiting throughout his ED course.  Did not require any medications. Referral to GI. Discussed return precautions with patient and they expressed understanding.     Portions of the record may have been created with voice recognition  "software. Occasional wrong word or \"sound a like\" substitutions may have occurred due to the inherent limitations of voice recognition software. Read the chart carefully and recognize, using context, where substitutions have occurred.     CRB/CRB       Amount and/or Complexity of Data Reviewed  Labs: ordered.        ED Course as of 07/23/25 1358   Wed Jul 23, 2025   1304 Bedside gallbladder ultrasound performed by me shows no evidence of acute cholecystitis. No gallstones visualized. No pericholecystic fluid. No gallbladder wall thickening   1306 EKG as per my interpretation sinus bradycardia. Rate 55. Normal axis. No ST abnormalities or prolonged intervals.        Medications - No data to display    ED Risk Strat Scores                    No data recorded        SBIRT 20yo+      Flowsheet Row Most Recent Value   Initial Alcohol Screen: US AUDIT-C     1. How often do you have a drink containing alcohol? 1 Filed at: 07/23/2025 1136   2. How many drinks containing alcohol do you have on a typical day you are drinking?  2 Filed at: 07/23/2025 1136   3b. FEMALE Any Age, or MALE 65+: How often do you have 4 or more drinks on one occassion? 1 Filed at: 07/23/2025 1136   Audit-C Score 4 Filed at: 07/23/2025 1136   MICKY: How many times in the past year have you...    Used an illegal drug or used a prescription medication for non-medical reasons? Daily or Almost Daily Filed at: 07/23/2025 1136   DAST-10: In the past 12 months...    1. Have you used drugs other than those required for medical reasons? 0 Filed at: 07/23/2025 1136   2. Do you use more than one drug at a time? 0 Filed at: 07/23/2025 1136   3. Have you had medical problems as a result of your drug use (e.g., memory loss, hepatitis, convulsions, bleeding, etc.)? 0 Filed at: 07/23/2025 1136   4. Have you had \"blackouts\" or \"flashbacks\" as a result of drug use?YesNo 0 Filed at: 07/23/2025 1136   5. Do you ever feel bad or guilty about your drug use? 0 Filed at: " 07/23/2025 1136   6. Does your spouse (or parent) ever complain about your involvement with drugs? 0 Filed at: 07/23/2025 1136   7. Have you neglected your family because of your use of drugs? 0 Filed at: 07/23/2025 1136   8. Have you engaged in illegal activities in order to obtain drugs? 0 Filed at: 07/23/2025 1136   9. Have you ever experienced withdrawal symptoms (felt sick) when you stopped taking drugs? 0 Filed at: 07/23/2025 1136   10. Are you always able to stop using drugs when you want to? 0 Filed at: 07/23/2025 1136   DAST-10 Score 0 Filed at: 07/23/2025 1136                            History of Present Illness       Chief Complaint   Patient presents with    Vomiting     Pt has been having n/v since 2 am this morning . Pt unable to keep fluids down        Past Medical History[1]   Past Surgical History[2]   Family History[3]   Social History[4]   E-Cigarette/Vaping    E-Cigarette Use Never User       E-Cigarette/Vaping Substances    Nicotine No     THC No     CBD No     Flavoring No     Other No     Unknown No       I have reviewed and agree with the history as documented.     History of prediabetes presents with vomiting since 3am. Fullness sensation in his stomach last evening when eating gummy bears/worms. 3 episodes of vomit with blood. Did not have blood when he initially started vomiting. Patient states the blood began after a 45 minute period of vomiting. Nausea has resolved at the time of my evaluation. Endorses a lightheaded and unsteady sensation that has also now resolved. Daily marijuana use last smoked yesterday. Cigarette smoker last cigarette this morning. Denies fever/chills, blood in the stool, chest pain, shortness of breath, urinary symptoms, headache, dizziness with syncope. Cologuard test a year ago was negative. No sick contacts. No new foods or medications introduced. Checks blood sugars daily.          Review of Systems   Constitutional:  Negative for chills and fever.   Eyes:   Negative for visual disturbance.   Respiratory:  Negative for shortness of breath.    Cardiovascular:  Negative for chest pain.   Gastrointestinal:  Positive for nausea and vomiting. Negative for abdominal distention, abdominal pain, blood in stool, constipation and diarrhea.   Genitourinary: Negative.    Musculoskeletal: Negative.    Skin:  Negative for pallor.   Neurological:  Positive for light-headedness. Negative for dizziness and headaches.           Objective       ED Triage Vitals [07/23/25 1133]   Temperature Pulse Blood Pressure Respirations SpO2 Patient Position - Orthostatic VS   99.1 °F (37.3 °C) 65 129/80 18 99 % Sitting      Temp Source Heart Rate Source BP Location FiO2 (%) Pain Score    Temporal -- Left arm -- 2      Vitals      Date and Time Temp Pulse SpO2 Resp BP Pain Score FACES Pain Rating User   07/23/25 1133 99.1 °F (37.3 °C) 65 99 % 18 129/80 2 -- BG            Physical Exam  Constitutional:       Appearance: Normal appearance.   HENT:      Head: Normocephalic and atraumatic.      Mouth/Throat:      Mouth: Mucous membranes are dry.     Cardiovascular:      Rate and Rhythm: Normal rate and regular rhythm.   Pulmonary:      Effort: Pulmonary effort is normal.      Breath sounds: Normal breath sounds.   Abdominal:      General: There is no distension.      Palpations: Abdomen is soft.      Tenderness: There is abdominal tenderness in the right upper quadrant and epigastric area. There is no guarding or rebound.      Comments: RUQ discomfort upon palpation as noted by the patient but not tender     Neurological:      General: No focal deficit present.      Mental Status: He is alert and oriented to person, place, and time.      Cranial Nerves: Cranial nerves 2-12 are intact.      Sensory: Sensation is intact.      Motor: Motor function is intact.      Coordination: Coordination is intact.         Results Reviewed       Procedure Component Value Units Date/Time    CBC and differential [[id number]]   (Normal) Collected: 07/23/25 1229    Lab Status: Final result Specimen: Blood from Arm, Left Updated: 07/23/25 1329     WBC 5.66 Thousand/uL      RBC 4.75 Million/uL      Hemoglobin 13.2 g/dL      Hematocrit 39.7 %      MCV 84 fL      MCH 27.8 pg      MCHC 33.2 g/dL      RDW 14.1 %      MPV 11.4 fL      Platelets 174 Thousands/uL     Narrative:      This is an appended report.  These results have been appended to a previously verified report.    Manual Differential(PHLEBS Do Not Order) [[id number]]  (Abnormal) Collected: 07/23/25 1229    Lab Status: Final result Specimen: Blood from Arm, Left Updated: 07/23/25 1329     Segmented % 60 %      Lymphocytes % 24 %      Monocytes % 7 %      Eosinophils % 5 %      Basophils % 0 %      Atypical Lymphocytes % 4 %      Absolute Neutrophils 3.40 Thousand/uL      Absolute Lymphocytes 1.58 Thousand/uL      Absolute Monocytes 0.40 Thousand/uL      Absolute Eosinophils 0.28 Thousand/uL      Absolute Basophils 0.00 Thousand/uL      Total Counted --     RBC Morphology Normal     Platelet Estimate Adequate    RBC Morphology Reflex Test [[id number]] Collected: 07/23/25 1229    Lab Status: In process Specimen: Blood from Arm, Left Updated: 07/23/25 1329    Comprehensive metabolic panel [[id number]]  (Abnormal) Collected: 07/23/25 1229    Lab Status: Final result Specimen: Blood from Arm, Left Updated: 07/23/25 1258     Sodium 140 mmol/L      Potassium 3.8 mmol/L      Chloride 107 mmol/L      CO2 28 mmol/L      ANION GAP 5 mmol/L      BUN 10 mg/dL      Creatinine 1.01 mg/dL      Glucose 96 mg/dL      Calcium 8.4 mg/dL      AST 14 U/L      ALT 17 U/L      Alkaline Phosphatase 62 U/L      Total Protein 6.2 g/dL      Albumin 4.0 g/dL      Total Bilirubin 0.71 mg/dL      eGFR 88 ml/min/1.73sq m     Narrative:      National Kidney Disease Foundation guidelines for Chronic Kidney Disease (CKD):     Stage 1 with normal or high GFR (GFR > 90 mL/min/1.73 square meters)    Stage 2 Mild CKD (GFR =  60-89 mL/min/1.73 square meters)    Stage 3A Moderate CKD (GFR = 45-59 mL/min/1.73 square meters)    Stage 3B Moderate CKD (GFR = 30-44 mL/min/1.73 square meters)    Stage 4 Severe CKD (GFR = 15-29 mL/min/1.73 square meters)    Stage 5 End Stage CKD (GFR <15 mL/min/1.73 square meters)  Note: GFR calculation is accurate only with a steady state creatinine    Lipase [[id number]]  (Normal) Collected: 25 1229    Lab Status: Final result Specimen: Blood from Arm, Left Updated: 25 1258     Lipase 38 u/L             No orders to display       Procedures    ED Medication and Procedure Management   Prior to Admission Medications   Prescriptions Last Dose Informant Patient Reported? Taking?   acetaminophen (TYLENOL) 650 mg CR tablet   No No   Sig: Take 1 tablet (650 mg total) by mouth every 8 (eight) hours as needed for mild pain   Patient not taking: Reported on 2021   ciprofloxacin (CILOXAN) 0.3 % ophthalmic solution   No No   Sig: Administer 1 drop into the left eye 3 (three) times a day   Patient not taking: Reported on 2021   erythromycin (ILOTYCIN) ophthalmic ointment   No No   Sig: Place a 1/2 inch ribbon of ointment into the lower eyelid.   Patient not taking: Reported on 2021   ibuprofen (MOTRIN) 600 mg tablet   No No   Sig: Take 1 tablet (600 mg total) by mouth every 6 (six) hours as needed for mild pain or moderate pain   oxymetazoline (AFRIN) 0.05 % nasal spray   No No   Si sprays by Each Nare route 2 (two) times a day for 3 days      Facility-Administered Medications: None     Discharge Medication List as of 2025  1:22 PM        CONTINUE these medications which have NOT CHANGED    Details   acetaminophen (TYLENOL) 650 mg CR tablet Take 1 tablet (650 mg total) by mouth every 8 (eight) hours as needed for mild pain, Starting Wed 2021, Print      ciprofloxacin (CILOXAN) 0.3 % ophthalmic solution Administer 1 drop into the left eye 3 (three) times a day, Starting Thu 2021,  Print      erythromycin (ILOTYCIN) ophthalmic ointment Place a 1/2 inch ribbon of ointment into the lower eyelid., Print      ibuprofen (MOTRIN) 600 mg tablet Take 1 tablet (600 mg total) by mouth every 6 (six) hours as needed for mild pain or moderate pain, Starting Wed 9/8/2021, Print      oxymetazoline (AFRIN) 0.05 % nasal spray 2 sprays by Each Nare route 2 (two) times a day for 3 days, Starting Wed 9/8/2021, Until Mon 9/13/2021, Print             ED SEPSIS DOCUMENTATION   Time reflects when diagnosis was documented in both MDM as applicable and the Disposition within this note       Time User Action Codes Description Comment    7/23/2025  1:19 PM Krys Zavaleta [K92.0] Hematemesis/vomiting blood     7/23/2025  1:20 PM Krys Zavaleta [K29.70] Gastritis     7/23/2025  1:20 PM Krys Zavaleta [F12.188] Cannabis hyperemesis syndrome concurrent with and due to cannabis abuse (HCC)                        [1]   Past Medical History:  Diagnosis Date    Diabetes mellitus (HCC)    [2] No past surgical history on file.  [3]   Family History  Problem Relation Name Age of Onset    Hypertension Mother      Heart disease Father      No Known Problems Sister      No Known Problems Daughter      No Known Problems Sister      No Known Problems Sister      No Known Problems Sister      No Known Problems Son      No Known Problems Son     [4]   Social History  Tobacco Use    Smoking status: Every Day     Current packs/day: 0.25     Types: Cigarettes    Smokeless tobacco: Never    Tobacco comments:     Per patient last time he smoke was two week ago   Vaping Use    Vaping status: Never Used   Substance Use Topics    Alcohol use: Yes     Comment: occasoinly    Drug use: Yes     Types: Marijuana     Comment: Occasional        Krys Zavaleta DO  07/23/25 7034

## 2025-07-29 NOTE — ED ATTENDING ATTESTATION
7/23/2025  I, Elba Moscoso MD, saw and evaluated the patient. I have discussed the patient with the resident/non-physician practitioner and agree with the resident's/non-physician practitioner's findings, Plan of Care, and MDM as documented in the resident's/non-physician practitioner's note, except where noted. All available labs and Radiology studies were reviewed.  I was present for key portions of any procedure(s) performed by the resident/non-physician practitioner and I was immediately available to provide assistance.       At this point I agree with the current assessment done in the Emergency Department.  I have conducted an independent evaluation of this patient a history and physical is as follows:  Patient here with vomiting, abdominal pain.  States that there was some blood after vomiting multiple times.  Now just feels a little distended and unwell when he eats.  No syncope or shortness of breath.  No history of GI bleeding.  Does have history of prediabetes.  On exam vital signs were reviewed.  Patient is awake, alert, interactive.  The patient's pupils are equally round reactive to light.  Oropharynx is clear with moist mucous membranes.  Neck is supple and nontender with no adenopathy or JVD.  Heart is regular with no murmurs, rubs, or gallops.  Lungs are clear and equal with no wheezes, rales, or rhonchi.  Abdomen is soft and nontender with no masses, rebound, or guarding. There is no CVA tenderness.  The patient was completely exposed.  There is no skin breakdown.  There are no rashes or skin changes.  Extremities are warm and well perfused with good pulses. The patient has normal strength, sensation, and cranial nerves.MEDICAL DECISION MAKING    Number and Complexity of Problems  Differential diagnosis: Likely Zulema-Mendenhall, doubt upper GI bleeding of clinical relevance, possible gastritis, possible symptomatic cholelithiasis    Medical Decision Making Data  External documents reviewed:    My EKG interpretation: Sinus bradycardia, narrow complex, normal axis, no ischemia or ectopy  My CT interpretation:   My X-ray interpretation:   My ultrasound interpretation: Bedside ultrasound performed, patient with normal gallbladder, normal dimensions of gallbladder, no evidence of stone disease, neck well-visualized, normal common bile duct    No orders to display       Labs Reviewed   COMPREHENSIVE METABOLIC PANEL - Abnormal       Result Value Ref Range Status    Sodium 140  135 - 147 mmol/L Final    Potassium 3.8  3.5 - 5.3 mmol/L Final    Chloride 107  96 - 108 mmol/L Final    CO2 28  21 - 32 mmol/L Final    ANION GAP 5  4 - 13 mmol/L Final    BUN 10  5 - 25 mg/dL Final    Creatinine 1.01  0.60 - 1.30 mg/dL Final    Comment: Standardized to IDMS reference method    Glucose 96  65 - 140 mg/dL Final    Comment: If the patient is fasting, the ADA then defines impaired fasting glucose as > 100 mg/dL and diabetes as > or equal to 123 mg/dL.    Calcium 8.4  8.4 - 10.2 mg/dL Final    AST 14  13 - 39 U/L Final    ALT 17  7 - 52 U/L Final    Comment: Specimen collection should occur prior to Sulfasalazine administration due to the potential for falsely depressed results.     Alkaline Phosphatase 62  34 - 104 U/L Final    Total Protein 6.2 (*) 6.4 - 8.4 g/dL Final    Albumin 4.0  3.5 - 5.0 g/dL Final    Total Bilirubin 0.71  0.20 - 1.00 mg/dL Final    Comment: Use of this assay is not recommended for patients undergoing treatment with eltrombopag due to the potential for falsely elevated results.  N-acetyl-p-benzoquinone imine (metabolite of Acetaminophen) will generate erroneously low results in samples for patients that have taken an overdose of Acetaminophen.    eGFR 88  ml/min/1.73sq m Final    Narrative:     [employer] guidelines for Chronic Kidney Disease (CKD):     Stage 1 with normal or high GFR (GFR > 90 mL/min/1.73 square meters)    Stage 2 Mild CKD (GFR = 60-89 mL/min/1.73 square  meters)    Stage 3A Moderate CKD (GFR = 45-59 mL/min/1.73 square meters)    Stage 3B Moderate CKD (GFR = 30-44 mL/min/1.73 square meters)    Stage 4 Severe CKD (GFR = 15-29 mL/min/1.73 square meters)    Stage 5 End Stage CKD (GFR <15 mL/min/1.73 square meters)  Note: GFR calculation is accurate only with a steady state creatinine   MANUAL DIFFERENTIAL(PHLEBS DO NOT ORDER) - Abnormal    Segmented % 60  43 - 75 % Final    Lymphocytes % 24  14 - 44 % Final    Monocytes % 7  4 - 12 % Final    Eosinophils % 5  0 - 6 % Final    Basophils % 0  0 - 1 % Final    Atypical Lymphocytes % 4 (*) <=0 % Final    Absolute Neutrophils 3.40  1.85 - 7.62 Thousand/uL Final    Absolute Lymphocytes 1.58  0.60 - 4.47 Thousand/uL Final    Absolute Monocytes 0.40  0.00 - 1.22 Thousand/uL Final    Absolute Eosinophils 0.28  0.00 - 0.40 Thousand/uL Final    Absolute Basophils 0.00  0.00 - 0.10 Thousand/uL Final    Total Counted     Final    RBC Morphology Normal   Final    Platelet Estimate Adequate  Adequate Final   CBC AND DIFFERENTIAL - Normal    WBC 5.66  4.31 - 10.16 Thousand/uL Final    RBC 4.75  3.88 - 5.62 Million/uL Final    Hemoglobin 13.2  12.0 - 17.0 g/dL Final    Hematocrit 39.7  36.5 - 49.3 % Final    MCV 84  82 - 98 fL Final    MCH 27.8  26.8 - 34.3 pg Final    MCHC 33.2  31.4 - 37.4 g/dL Final    RDW 14.1  11.6 - 15.1 % Final    MPV 11.4  8.9 - 12.7 fL Final    Platelets 174  149 - 390 Thousands/uL Final    Narrative:     This is an appended report.  These results have been appended to a previously verified report.   LIPASE - Normal    Lipase 38  11 - 82 u/L Final       Labs reviewed by me are significant for: Unremarkable    Clinical decision rules/scores are significant for:     Discussed case with:   Considered admission for:     Treatment and Disposition  ED course: Patient seen examined.  Treated symptomatically, will discharge home with diagnosis of gastritis, Zulema-Mendenhall tear  Shared decision making:   Code status:      ED Course         Critical Care Time  Procedures